# Patient Record
(demographics unavailable — no encounter records)

---

## 2025-07-16 NOTE — REVIEW OF SYSTEMS
[Abdominal Pain] : abdominal pain [Blood in urine that you can see] : blood visible in urine [Told you have blood in urine on a urine test] : told blood was present in a urine test [Itching] : itching [Negative] : Heme/Lymph

## 2025-07-16 NOTE — HISTORY OF PRESENT ILLNESS
[FreeTextEntry1] : He is a 22-year-old man who is seen today for initial visit.  He was told to have microscopic blood in the urine.  However recently after hiking and also once when he was not very hydrated he noticed blood in the urine.  He is a non-smoker.  There is family history of kidney stones.  He has no flank pain.  Results from [facility] in June 2025 showed 2+ blood on urinalysis, 0-2 red blood cells, creatinine was 1.19.  He is a non-smoker.

## 2025-07-16 NOTE — LETTER BODY
[Dear  ___] : Dear  [unfilled], [Consult Letter:] : I had the pleasure of evaluating your patient, [unfilled]. [Consult Closing:] : Thank you very much for allowing me to participate in the care of this patient.  If you have any questions, please do not hesitate to contact me. [FreeTextEntry1] : Address: [address] [place] [address]. [address], [place] [address] Phone: [phone]

## 2025-07-16 NOTE — PHYSICAL EXAM
[General Appearance - Well Developed] : well developed [General Appearance - Well Nourished] : well nourished [Normal Appearance] : normal appearance [Well Groomed] : well groomed [General Appearance - In No Acute Distress] : no acute distress [Edema] : no peripheral edema [Exaggerated Use Of Accessory Muscles For Inspiration] : no accessory muscle use [Costovertebral Angle Tenderness] : no ~M costovertebral angle tenderness [Normal Station and Gait] : the gait and station were normal for the patient's age [] : no rash [No Focal Deficits] : no focal deficits [Oriented To Time, Place, And Person] : oriented to person, place, and time [Affect] : the affect was normal [Mood] : the mood was normal [Not Anxious] : not anxious

## 2025-07-16 NOTE — ASSESSMENT
[FreeTextEntry1] : We discussed the difference between microscopic and gross hematuria. Potential benign and malignant urological conditions that can cause hematuria were reviewed. Also, non-urologic causes of hematuria were reviewed. Workup including renal imaging (ultrasonography, CT scan, MRI), urine studies and cystoscopy were reviewed. Workup based on risk stratification including age, degree of hematuria and risk factors were discussed. Risks of cystoscopy were discussed included but were not limited to bleeding, infection, stricture formation, etc. Patient was made aware that despite adequate workup, the cause for hematuria may not be discovered and continued follow-up is recommended. Patient's questions were answered.  He will undergo cystoscopy and CT urogram.  Urine culture will be sent.